# Patient Record
Sex: FEMALE | URBAN - NONMETROPOLITAN AREA
[De-identification: names, ages, dates, MRNs, and addresses within clinical notes are randomized per-mention and may not be internally consistent; named-entity substitution may affect disease eponyms.]

---

## 2024-06-24 ENCOUNTER — APPOINTMENT (RX ONLY)
Dept: URBAN - NONMETROPOLITAN AREA CLINIC 13 | Facility: CLINIC | Age: 79
Setting detail: DERMATOLOGY
End: 2024-06-24

## 2024-06-24 DIAGNOSIS — Z41.9 ENCOUNTER FOR PROCEDURE FOR PURPOSES OTHER THAN REMEDYING HEALTH STATE, UNSPECIFIED: ICD-10-CM

## 2024-06-24 PROCEDURE — ? MEDICAL CONSULTATION: FILLERS

## 2024-06-24 PROCEDURE — ? COSMETIC CONSULTATION: Q SWITCHED LASER

## 2024-06-24 PROCEDURE — ? COSMETIC CONSULTATION: BOTULINUM TOXIN

## 2024-06-24 PROCEDURE — ? MEDICAL CONSULTATION: BBL

## 2024-06-24 PROCEDURE — ? COSMETIC CONSULTATION: FRACTIONAL RESURFACING

## 2024-06-24 PROCEDURE — ? ADDITIONAL NOTES

## 2024-06-24 NOTE — PROCEDURE: ADDITIONAL NOTES
Render Risk Assessment In Note?: no
Additional Notes: Dr. Vigil was consulted and he agrees with treatment plan as per written protocol. \\Laurence will check her schedule and call to make an appointment for fillers and possibly Botox.
Detail Level: Simple

## 2024-07-02 ENCOUNTER — APPOINTMENT (RX ONLY)
Dept: URBAN - NONMETROPOLITAN AREA CLINIC 13 | Facility: CLINIC | Age: 79
Setting detail: DERMATOLOGY
End: 2024-07-02

## 2024-07-02 DIAGNOSIS — Z41.9 ENCOUNTER FOR PROCEDURE FOR PURPOSES OTHER THAN REMEDYING HEALTH STATE, UNSPECIFIED: ICD-10-CM

## 2024-07-02 PROCEDURE — ? BOTOX

## 2024-07-02 PROCEDURE — ? JUVEDERM VOLUMA XC INJECTION

## 2024-07-02 PROCEDURE — ? JUVEDERM VOLLURE XC INJECTION

## 2024-07-02 NOTE — PROCEDURE: JUVEDERM VOLUMA XC INJECTION
Number Of Syringes (Required For Inventory): 1
Include Cannula Length?: 1 inch
Additional Area 5 Volume In Cc: 0
Anesthesia Volume In Cc: 0.5
Lot #: [id number]
Additional Area 2 Location: R cheek and lateral face
Additional Anesthesia Type: 0.1% lidocaine with 1:1,000,000 epinephrine (tumescent anesthesia)
Additional Area 4 Location: lateral tear troughs
Map Statment: See Attach Map for Complete Details
Price (Use Numbers Only, No Special Characters Or $): 0962.0
Procedural Text: The filler was administered to the treatment areas noted above. .Lateral face was injected with cannula.\\n.
Consent: Written consent obtained. Risks include but not limited to bruising, beading, irregular texture, ulceration, infection, allergic reaction, scar formation, incomplete augmentation, temporary nature, procedural pain.
Filler: Juvederm Voluma XC
Include Cannula Size?: 25G
Include Cannula Information In Note?: Yes
Expiration Date (Month Year): 2/25
Post-Care Instructions: Patient instructed to apply ice to reduce swelling. Call with any concerns.
Detail Level: Detailed
Additional Area 5 Location: L cheek touch up
Use Map Statement For Sites (Optional): No
Additional Area 3 Location: chin and jaw line
Additional Anesthesia Volume In Cc: 0.2
Additional Area 1 Location: jawline lateral

## 2024-07-02 NOTE — PROCEDURE: BOTOX
Additional Area 1 Units: 0
Dilution (U/0.1 Cc): 1
Show Right And Left Periorbital Units: No
Additional Area 1 Location: Lateral  brows 6U  R side, 4 U L
Consent: Written consent obtained. Risks include but not limited to lid/brow ptosis, bruising, swelling, diplopia, temporary effect, incomplete chemical denervation.
Additional Area 3 Location: chin
Show Additional Area 3: Yes
Additional Area 6 Location: lateral brows
Post-Care Instructions: Patient instructed to not lie down for 4 hours and limit physical activity for 24 hours. Patient instructed not to travel by airplane for 48 hours.
Additional Area 2 Location: lip upper
Additional Area 5 Location: gummy smile 1u R
Incrementing Botox Units: By 0.5 Units
Expiration Date (Month Year): 4/26
Additional Area 4 Location: under lower lash line
Price (Use Numbers Only, No Special Characters Or $): 420.0
Glabellar Complex Units: 25
Forehead Units: 5
Lot #: 
Detail Level: Zone

## 2024-07-02 NOTE — PROCEDURE: JUVEDERM VOLLURE XC INJECTION
Additional Anesthesia Volume In Cc: 6
Additional Area 1 Location: jaw line
Map Statment: See Attach Map for Complete Details
Vermilion Lips Filler Volume In Cc: 0
Number Of Syringes (Required For Inventory): 1
Detail Level: Detailed
Marionette Lines Filler Volume In Cc: 2.4
Use Map Statement For Sites (Optional): No
Anesthesia Volume In Cc: 0.5
Procedural Text: The filler was administered to the treatment areas noted above.
Lot #: [id number]
Consent: Written consent obtained. Risks include but not limited to bruising, beading, irregular texture, ulceration, infection, allergic reaction, scar formation, incomplete augmentation, temporary nature, procedural pain.
Post-Care Instructions: Patient instructed to apply ice to reduce swelling.
Anesthesia Type: 1% lidocaine with epinephrine
Filler: Juvederm Vollure XC
Price (Use Numbers Only, No Special Characters Or $): 0.0
Expiration Date (Month Year): 9/24

## 2024-07-16 ENCOUNTER — APPOINTMENT (RX ONLY)
Dept: URBAN - NONMETROPOLITAN AREA CLINIC 13 | Facility: CLINIC | Age: 79
Setting detail: DERMATOLOGY
End: 2024-07-16

## 2024-07-16 DIAGNOSIS — Z41.9 ENCOUNTER FOR PROCEDURE FOR PURPOSES OTHER THAN REMEDYING HEALTH STATE, UNSPECIFIED: ICD-10-CM

## 2024-07-16 PROCEDURE — ? ADDITIONAL NOTES

## 2024-07-16 PROCEDURE — ? RESTYLANE LYFT INJECTION

## 2024-07-16 NOTE — PROCEDURE: ADDITIONAL NOTES
Additional Notes: Follow up fillers 2 weeks ago. Mercedez is pleased but was hoping for more of a lift. She will be back in the fall for more filler. One cc of PS Lyft injecteded at no cost.
Render Risk Assessment In Note?: no
Detail Level: Simple

## 2024-07-16 NOTE — PROCEDURE: RESTYLANE LYFT INJECTION
Additional Area 4 Location: jaw line
Tear Troughs Filler Volume In Cc: 0
Anesthesia Type: 1% lidocaine with epinephrine
Post-Care Instructions: Patient instructed to apply ice to reduce swelling. Call office with any concerns.
Filler: Nathanael Juarez
Include Cannula Length?: 1 inch
Lot #: [id number]
Procedural Text: The filler was administered to the treatment areas noted above. Cannula was used in lateral face.
Additional Area 2 Location: top of hands
Detail Level: Detailed
Lateral Face Filler Volume In Cc: 1
Include Cannula Information In Note?: Yes
Map Statement: See Attach Map for Complete Details
Use Map Statement For Sites (Optional): No
Consent: Written consent obtained. Risks include but not limited to bruising, beading, irregular texture, ulceration, infection, allergic reaction, scar formation, incomplete augmentation, temporary nature, procedural pain. Patient asked to call with any concerns. Follow up if needed.
Include Cannula Size?: 25G
Expiration Date (Month Year): 4/26
Additional Area 1 Location: temples and lateral upper face